# Patient Record
Sex: MALE | Race: WHITE | Employment: STUDENT | URBAN - NONMETROPOLITAN AREA
[De-identification: names, ages, dates, MRNs, and addresses within clinical notes are randomized per-mention and may not be internally consistent; named-entity substitution may affect disease eponyms.]

---
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
## 2024-01-25 ENCOUNTER — HOSPITAL ENCOUNTER (EMERGENCY)
Age: 19
Discharge: HOME | End: 2024-01-25
Payer: OTHER GOVERNMENT

## 2024-01-25 VITALS
TEMPERATURE: 97.3 F | SYSTOLIC BLOOD PRESSURE: 137 MMHG | WEIGHT: 250 LBS | OXYGEN SATURATION: 97 % | BODY MASS INDEX: 35 KG/M2 | HEART RATE: 75 BPM | RESPIRATION RATE: 14 BRPM | DIASTOLIC BLOOD PRESSURE: 75 MMHG | HEIGHT: 71 IN

## 2024-01-25 DIAGNOSIS — U07.1 UPPER RESPIRATORY TRACT INFECTION DUE TO COVID-19 VIRUS: Primary | ICD-10-CM

## 2024-01-25 DIAGNOSIS — J06.9 UPPER RESPIRATORY TRACT INFECTION DUE TO COVID-19 VIRUS: Primary | ICD-10-CM

## 2024-01-25 LAB
FLUAV AG SPEC QL: NEGATIVE
FLUBV AG SPEC QL: NEGATIVE
SARS-COV-2 RDRP RESP QL NAA+PROBE: DETECTED

## 2024-01-25 PROCEDURE — 99213 OFFICE O/P EST LOW 20 MIN: CPT

## 2024-01-25 PROCEDURE — 99213 OFFICE O/P EST LOW 20 MIN: CPT | Performed by: NURSE PRACTITIONER

## 2024-01-25 PROCEDURE — 87635 SARS-COV-2 COVID-19 AMP PRB: CPT

## 2024-01-25 PROCEDURE — 87804 INFLUENZA ASSAY W/OPTIC: CPT

## 2024-01-25 RX ORDER — DEXTROMETHORPHAN HYDROBROMIDE AND PROMETHAZINE HYDROCHLORIDE 15; 6.25 MG/5ML; MG/5ML
5 SYRUP ORAL 4 TIMES DAILY PRN
Qty: 118 ML | Refills: 0 | Status: SHIPPED | OUTPATIENT
Start: 2024-01-25 | End: 2024-02-01

## 2024-01-25 RX ORDER — CETIRIZINE HYDROCHLORIDE, PSEUDOEPHEDRINE HYDROCHLORIDE 5; 120 MG/1; MG/1
1 TABLET, FILM COATED, EXTENDED RELEASE ORAL 2 TIMES DAILY
Qty: 60 TABLET | Refills: 0 | Status: SHIPPED | OUTPATIENT
Start: 2024-01-25 | End: 2024-02-24

## 2024-01-25 RX ORDER — AZELASTINE 1 MG/ML
1 SPRAY, METERED NASAL 2 TIMES DAILY
Qty: 30 ML | Refills: 0 | Status: SHIPPED | OUTPATIENT
Start: 2024-01-25

## 2024-01-25 RX ORDER — IBUPROFEN 400 MG/1
400 TABLET ORAL EVERY 6 HOURS PRN
Qty: 120 TABLET | Refills: 0 | Status: SHIPPED | OUTPATIENT
Start: 2024-01-25

## 2024-01-25 ASSESSMENT — ENCOUNTER SYMPTOMS
NAUSEA: 0
VOMITING: 0
FLU SYMPTOMS: 1
APNEA: 0
SORE THROAT: 1
SINUS PRESSURE: 1
COUGH: 1
SHORTNESS OF BREATH: 0
RHINORRHEA: 1
DIARRHEA: 0
STRIDOR: 0
CHOKING: 0
WHEEZING: 0

## 2024-01-25 ASSESSMENT — PAIN DESCRIPTION - LOCATION: LOCATION: OTHER (COMMENT)

## 2024-01-25 ASSESSMENT — PAIN SCALES - GENERAL: PAINLEVEL_OUTOF10: 5

## 2024-01-25 ASSESSMENT — PAIN - FUNCTIONAL ASSESSMENT: PAIN_FUNCTIONAL_ASSESSMENT: 0-10

## 2024-01-25 NOTE — DISCHARGE INSTRUCTIONS
Suggestions for symptom management that are available over the counter.   If you have questions regarding allergies or contraindications to use, please speak to the pharmacy staff or your family provider.    For fevers or pain: acetaminophen (Tylenol), ibuprofen (Motrin)  For dry cough: medications containing dextromethorphan, such as Delsym, Robitussin DM or Mucinex DM and medicated throat lozenges  For congestion or sinus pressure: decongestant nasal sprays, such as Afrin (for up to 3 days), medications containing guaifenesin to help break up mucus, such as Mucinex or Robitussin, nasal steroid sprays, such as Flonase, Sensimist, Rhinocort or Nasonex and saline nasal sprays, neti pot or sinus rinse bottle  For runny nose, sneezing or watery/itchy eyes: less sedating antihistamines, such as loratidine (Claritin), fexofenadine (Allegra) or Cetirizine (Zyrtec) and antihistamine eye drops, such as ketotifen (Zaditor, Alaway) or olopatadine (Pataday)  For sore throat:  Chloraseptic throat spray or sore throat lozenges or  Mucinex Instasoothe Sore Throat & Pain Cherry Spray  If you have high blood pressure, a brand like Coricidin HBP may be an option.you should avoid medications containing pseudoephedrine or phenylephrine, such as Sudafed,  running a humidifier in your bedroom may be helpful for many of your symptoms.  If your cough is keeping you awake at night, you can try raising your head with an extra pillow.  If the skin around your nose and lips becomes sore, you can put some petroleum jelly on the area.      Suggestions for over-the-counter supplements to help your immune system, if you have questions regarding allergies or contraindications to use, please speak to the pharmacy staff or your family provider.    Multi-vitamin/multi-mineral daily   Vitamin D3 3000 IU daily  Zinc 30 mg daily  Vitamin C 1000 mg twice daily  B-100 complex as daily as directed on bottle  Probiotic/Prebiotic corley  Gargle with  mouthwash 3 times daily, may use Scope, Crest, or Listerine.    COLD-EEZE  over the counter: Use as directed on package. Zinc gluconate lozenges  are used to help make cold symptoms less severe or shorter in duration. This includes sore throat, cough, sneezing, stuffy nose, and a hoarse voice.    Adequate nutrition, hydration, and rest are also important to good health and recovery.

## 2024-01-25 NOTE — ED PROVIDER NOTES
dizziness and light-headedness.       PAST MEDICAL HISTORY   History reviewed. No pertinent past medical history.    SURGICAL HISTORY     Patient  has no past surgical history on file.    CURRENT MEDICATIONS       Discharge Medication List as of 1/25/2024 12:41 PM          ALLERGIES     Patient is has No Known Allergies.    FAMILY HISTORY     Patient's family history is not on file.    SOCIAL HISTORY     Patient  reports that he has never smoked. His smokeless tobacco use includes snuff. He reports current alcohol use. He reports that he does not use drugs.    PHYSICAL EXAM     ED TRIAGE VITALS  BP: 137/75, Temp: 97.3 °F (36.3 °C), Pulse: 75, Respirations: 14, SpO2: 97 %  Physical Exam  Vitals and nursing note reviewed.   Constitutional:       General: He is not in acute distress.     Appearance: Normal appearance. He is normal weight. He is not ill-appearing, toxic-appearing or diaphoretic.   HENT:      Head: Normocephalic and atraumatic.      Right Ear: Tympanic membrane, ear canal and external ear normal.      Left Ear: Tympanic membrane, ear canal and external ear normal.      Nose: Congestion and rhinorrhea present.      Mouth/Throat:      Mouth: Mucous membranes are moist.   Eyes:      Extraocular Movements: Extraocular movements intact.      Conjunctiva/sclera: Conjunctivae normal.   Pulmonary:      Effort: Pulmonary effort is normal. No respiratory distress.      Breath sounds: Normal breath sounds. No stridor. No wheezing, rhonchi or rales.   Chest:      Chest wall: No tenderness.   Musculoskeletal:         General: Normal range of motion.      Cervical back: Normal range of motion.   Skin:     General: Skin is warm.   Neurological:      General: No focal deficit present.      Mental Status: He is alert and oriented to person, place, and time.   Psychiatric:         Mood and Affect: Mood normal.         Behavior: Behavior normal.         Thought Content: Thought content normal.         Judgment: Judgment  each nostril as directed, Disp-30 mL, R-0Normal      Benzocaine-Menthol (CEPACOL EXTRA STRENGTH) 15-2.6 MG LOZG lozenge Take 1 lozenge by mouth every 2 hours as needed for Sore Throat, R-0OTC      cetirizine-psuedoephedrine (ZYRTEC-D) 5-120 MG per extended release tablet Take 1 tablet by mouth 2 times daily, Disp-60 tablet, R-0Normal           Discharge Medication List as of 1/25/2024 12:41 PM          ABBIE Moraes CNP, ABBIE El CNP  01/25/24 1251

## 2024-01-25 NOTE — ED TRIAGE NOTES
To room 3 c/o cough congsetionbody aches Pt concerned\" My roommates have Infuenza b and covid- tested positive tuesday